# Patient Record
Sex: FEMALE | Race: BLACK OR AFRICAN AMERICAN | NOT HISPANIC OR LATINO | Employment: PART TIME | URBAN - METROPOLITAN AREA
[De-identification: names, ages, dates, MRNs, and addresses within clinical notes are randomized per-mention and may not be internally consistent; named-entity substitution may affect disease eponyms.]

---

## 2020-07-26 ENCOUNTER — HOSPITAL ENCOUNTER (EMERGENCY)
Facility: HOSPITAL | Age: 20
Discharge: HOME | End: 2020-07-27
Attending: EMERGENCY MEDICINE
Payer: COMMERCIAL

## 2020-07-26 DIAGNOSIS — N39.0 URINARY TRACT INFECTION WITHOUT HEMATURIA, SITE UNSPECIFIED: ICD-10-CM

## 2020-07-26 DIAGNOSIS — R10.2 PELVIC PAIN: Primary | ICD-10-CM

## 2020-07-26 LAB
ALBUMIN SERPL BCP-MCNC: 4.2 G/DL (ref 3.5–5.2)
ALP SERPL-CCNC: 43 U/L (ref 55–135)
ALT SERPL W/O P-5'-P-CCNC: 23 U/L (ref 10–44)
AMPHET+METHAMPHET UR QL: NEGATIVE
ANION GAP SERPL CALC-SCNC: 9 MMOL/L (ref 8–16)
AST SERPL-CCNC: 20 U/L (ref 10–40)
B-HCG UR QL: NEGATIVE
BACTERIA #/AREA URNS HPF: ABNORMAL /HPF
BARBITURATES UR QL SCN>200 NG/ML: NEGATIVE
BASOPHILS # BLD AUTO: 0.03 K/UL (ref 0–0.2)
BASOPHILS NFR BLD: 0.4 % (ref 0–1.9)
BENZODIAZ UR QL SCN>200 NG/ML: NEGATIVE
BILIRUB SERPL-MCNC: 0.8 MG/DL (ref 0.1–1)
BILIRUB UR QL STRIP: NEGATIVE
BUN SERPL-MCNC: 8 MG/DL (ref 6–20)
BZE UR QL SCN: NEGATIVE
CALCIUM SERPL-MCNC: 9 MG/DL (ref 8.7–10.5)
CANNABINOIDS UR QL SCN: NEGATIVE
CHLORIDE SERPL-SCNC: 106 MMOL/L (ref 95–110)
CK SERPL-CCNC: 53 U/L (ref 20–180)
CLARITY UR: CLEAR
CO2 SERPL-SCNC: 25 MMOL/L (ref 23–29)
COLOR UR: YELLOW
CREAT SERPL-MCNC: 0.6 MG/DL (ref 0.5–1.4)
CREAT UR-MCNC: 45 MG/DL (ref 15–325)
CTP QC/QA: YES
DIFFERENTIAL METHOD: ABNORMAL
EOSINOPHIL # BLD AUTO: 0.1 K/UL (ref 0–0.5)
EOSINOPHIL NFR BLD: 0.7 % (ref 0–8)
ERYTHROCYTE [DISTWIDTH] IN BLOOD BY AUTOMATED COUNT: 13.5 % (ref 11.5–14.5)
EST. GFR  (AFRICAN AMERICAN): >60 ML/MIN/1.73 M^2
EST. GFR  (NON AFRICAN AMERICAN): >60 ML/MIN/1.73 M^2
GLUCOSE SERPL-MCNC: 90 MG/DL (ref 70–110)
GLUCOSE UR QL STRIP: NEGATIVE
HCT VFR BLD AUTO: 35.1 % (ref 37–48.5)
HGB BLD-MCNC: 11.5 G/DL (ref 12–16)
HGB UR QL STRIP: NEGATIVE
HYALINE CASTS #/AREA URNS LPF: 11 /LPF
IMM GRANULOCYTES # BLD AUTO: 0.02 K/UL (ref 0–0.04)
IMM GRANULOCYTES NFR BLD AUTO: 0.2 % (ref 0–0.5)
KETONES UR QL STRIP: NEGATIVE
LEUKOCYTE ESTERASE UR QL STRIP: ABNORMAL
LIPASE SERPL-CCNC: 35 U/L (ref 4–60)
LYMPHOCYTES # BLD AUTO: 2.3 K/UL (ref 1–4.8)
LYMPHOCYTES NFR BLD: 28.2 % (ref 18–48)
MCH RBC QN AUTO: 25.2 PG (ref 27–31)
MCHC RBC AUTO-ENTMCNC: 32.8 G/DL (ref 32–36)
MCV RBC AUTO: 77 FL (ref 82–98)
MICROSCOPIC COMMENT: ABNORMAL
MONOCYTES # BLD AUTO: 0.6 K/UL (ref 0.3–1)
MONOCYTES NFR BLD: 7.3 % (ref 4–15)
NEUTROPHILS # BLD AUTO: 5.2 K/UL (ref 1.8–7.7)
NEUTROPHILS NFR BLD: 63.2 % (ref 38–73)
NITRITE UR QL STRIP: NEGATIVE
NRBC BLD-RTO: 0 /100 WBC
OPIATES UR QL SCN: NEGATIVE
PCP UR QL SCN>25 NG/ML: NEGATIVE
PH UR STRIP: 6 [PH] (ref 5–8)
PLATELET # BLD AUTO: 280 K/UL (ref 150–350)
PMV BLD AUTO: 10.8 FL (ref 9.2–12.9)
POTASSIUM SERPL-SCNC: 3.4 MMOL/L (ref 3.5–5.1)
PROT SERPL-MCNC: 7.7 G/DL (ref 6–8.4)
PROT UR QL STRIP: NEGATIVE
RBC # BLD AUTO: 4.56 M/UL (ref 4–5.4)
RBC #/AREA URNS HPF: 1 /HPF (ref 0–4)
SODIUM SERPL-SCNC: 140 MMOL/L (ref 136–145)
SP GR UR STRIP: 1.01 (ref 1–1.03)
SQUAMOUS #/AREA URNS HPF: 7 /HPF
TOXICOLOGY INFORMATION: NORMAL
URN SPEC COLLECT METH UR: ABNORMAL
UROBILINOGEN UR STRIP-ACNC: NEGATIVE EU/DL
WBC # BLD AUTO: 8.19 K/UL (ref 3.9–12.7)
WBC #/AREA URNS HPF: 16 /HPF (ref 0–5)

## 2020-07-26 PROCEDURE — 81025 URINE PREGNANCY TEST: CPT | Performed by: EMERGENCY MEDICINE

## 2020-07-26 PROCEDURE — 80053 COMPREHEN METABOLIC PANEL: CPT

## 2020-07-26 PROCEDURE — 85025 COMPLETE CBC W/AUTO DIFF WBC: CPT

## 2020-07-26 PROCEDURE — 87086 URINE CULTURE/COLONY COUNT: CPT

## 2020-07-26 PROCEDURE — 99284 EMERGENCY DEPT VISIT MOD MDM: CPT | Mod: 25

## 2020-07-26 PROCEDURE — 96365 THER/PROPH/DIAG IV INF INIT: CPT

## 2020-07-26 PROCEDURE — 81001 URINALYSIS AUTO W/SCOPE: CPT | Mod: 59

## 2020-07-26 PROCEDURE — 83690 ASSAY OF LIPASE: CPT

## 2020-07-26 PROCEDURE — 82550 ASSAY OF CK (CPK): CPT

## 2020-07-26 PROCEDURE — 80307 DRUG TEST PRSMV CHEM ANLYZR: CPT

## 2020-07-27 VITALS
RESPIRATION RATE: 18 BRPM | WEIGHT: 102 LBS | HEART RATE: 78 BPM | BODY MASS INDEX: 17.42 KG/M2 | DIASTOLIC BLOOD PRESSURE: 53 MMHG | HEIGHT: 64 IN | OXYGEN SATURATION: 100 % | TEMPERATURE: 98 F | SYSTOLIC BLOOD PRESSURE: 103 MMHG

## 2020-07-27 PROCEDURE — 25500020 PHARM REV CODE 255: Performed by: EMERGENCY MEDICINE

## 2020-07-27 PROCEDURE — 25000003 PHARM REV CODE 250: Performed by: EMERGENCY MEDICINE

## 2020-07-27 PROCEDURE — 63600175 PHARM REV CODE 636 W HCPCS: Performed by: EMERGENCY MEDICINE

## 2020-07-27 RX ORDER — POTASSIUM CHLORIDE 20 MEQ/1
40 TABLET, EXTENDED RELEASE ORAL ONCE
Status: COMPLETED | OUTPATIENT
Start: 2020-07-27 | End: 2020-07-27

## 2020-07-27 RX ORDER — AMOXICILLIN AND CLAVULANATE POTASSIUM 875; 125 MG/1; MG/1
1 TABLET, FILM COATED ORAL 2 TIMES DAILY
Qty: 20 TABLET | Refills: 0 | Status: SHIPPED | OUTPATIENT
Start: 2020-07-27

## 2020-07-27 RX ADMIN — POTASSIUM CHLORIDE 40 MEQ: 1500 TABLET, EXTENDED RELEASE ORAL at 01:07

## 2020-07-27 RX ADMIN — CEFTRIAXONE SODIUM 1 G: 1 INJECTION, POWDER, FOR SOLUTION INTRAMUSCULAR; INTRAVENOUS at 01:07

## 2020-07-27 RX ADMIN — IOHEXOL 75 ML: 350 INJECTION, SOLUTION INTRAVENOUS at 12:07

## 2020-07-27 NOTE — ED PROVIDER NOTES
Encounter Date: 7/26/2020       History     Chief Complaint   Patient presents with    Abdominal Pain    Back Pain     This is a 20-year-old female who presents complaining of intermittent crampy suprapubic pain with associated nonradiating lower back pain.  The symptoms have been intermittent over the past several days although she states she has had symptoms of this nature off and on for up to a year.  She has not been seen by gynecologist or primary care physician.  The symptoms did worsen slightly over the last 3 days and were occurring more frequently.  She states she is not sexually active and has never had intercourse.  She denies any vaginal discharge or irregularity of her menses.  She denies fever or chills.  She denies any coughing cold or COVID-19 type symptoms.  She denies nausea vomiting or diarrhea.  Symptoms have been mild to moderate in intensity with no exacerbating or alleviating factors.  Symptoms are not worse with movement.  She denies any other problems or complaints.        Review of patient's allergies indicates:  No Known Allergies  No past medical history on file.  No past surgical history on file.  No family history on file.  Social History     Tobacco Use    Smoking status: Not on file   Substance Use Topics    Alcohol use: Not on file    Drug use: Not on file     Review of Systems   Constitutional: Negative.  Negative for activity change, appetite change, chills, fatigue and fever.   HENT: Negative.  Negative for congestion, dental problem, ear pain, rhinorrhea, sinus pressure, sinus pain, sore throat and trouble swallowing.    Eyes: Negative.  Negative for photophobia, pain, redness and visual disturbance.   Respiratory: Negative.  Negative for cough, chest tightness, shortness of breath and wheezing.    Cardiovascular: Negative.  Negative for chest pain, palpitations and leg swelling.   Gastrointestinal: Positive for abdominal pain. Negative for abdominal distention, anal  bleeding, blood in stool, constipation, diarrhea, nausea and vomiting.   Endocrine: Negative.    Genitourinary: Negative.  Negative for decreased urine volume, difficulty urinating, dysuria, flank pain, frequency, hematuria, pelvic pain and urgency.   Musculoskeletal: Negative.  Negative for arthralgias, back pain, gait problem, joint swelling, myalgias, neck pain and neck stiffness.   Skin: Negative.  Negative for color change, pallor and rash.   Neurological: Negative.  Negative for dizziness, tremors, seizures, syncope, facial asymmetry, speech difficulty, weakness, light-headedness, numbness and headaches.   Hematological: Negative.  Does not bruise/bleed easily.   Psychiatric/Behavioral: Negative.  Negative for confusion.   All other systems reviewed and are negative.      Physical Exam     Initial Vitals [07/26/20 2246]   BP Pulse Resp Temp SpO2   115/70 84 20 98.4 °F (36.9 °C) 99 %      MAP       --         Physical Exam    Nursing note and vitals reviewed.  Constitutional: She is active and cooperative.  Non-toxic appearance. She does not have a sickly appearance. She does not appear ill. No distress.   HENT:   Head: Normocephalic and atraumatic.   Right Ear: Tympanic membrane normal.   Left Ear: Tympanic membrane normal.   Nose: Nose normal.   Mouth/Throat: Uvula is midline, oropharynx is clear and moist and mucous membranes are normal. No oral lesions. No uvula swelling. No oropharyngeal exudate, posterior oropharyngeal edema or posterior oropharyngeal erythema.   Eyes: Conjunctivae, EOM and lids are normal. Pupils are equal, round, and reactive to light. No scleral icterus.   Neck: Trachea normal, normal range of motion, full passive range of motion without pain and phonation normal. Neck supple. No thyroid mass present. No stridor present. No spinous process tenderness and no muscular tenderness present. No edema, no erythema and normal range of motion present. No neck rigidity. No JVD present.    Cardiovascular: Normal rate, regular rhythm, normal heart sounds, intact distal pulses and normal pulses. Exam reveals no gallop and no friction rub.    No murmur heard.  Pulmonary/Chest: Effort normal and breath sounds normal. No accessory muscle usage. No tachypnea. No respiratory distress. She has no wheezes. She has no rhonchi. She has no rales.   Abdominal: Soft. Normal appearance and bowel sounds are normal. She exhibits no distension, no pulsatile midline mass and no mass. There is abdominal tenderness in the suprapubic area. There is rebound. There is no rigidity, no guarding, no CVA tenderness, no tenderness at McBurney's point and negative Amaya's sign.   Genitourinary:    Genitourinary Comments: Patient refused pelvic exam.     Musculoskeletal: Normal range of motion. No tenderness or edema.      Comments: Pulses are 2+ throughout, cap refill is less than 2 sec throughout, extremities are nontender throughout with full range of motion. There is no spinal tenderness to palpation.   Neurological: She is alert and oriented to person, place, and time. She has normal strength. She displays normal reflexes. No cranial nerve deficit or sensory deficit.   No focal deficits.   Skin: Skin is warm, dry and intact. Capillary refill takes less than 2 seconds. No ecchymosis, no petechiae and no rash noted. No erythema. No pallor.   Psychiatric: She has a normal mood and affect. Her speech is normal and behavior is normal. Judgment and thought content normal. Cognition and memory are normal.         ED Course   Procedures  Labs Reviewed   CBC W/ AUTO DIFFERENTIAL - Abnormal; Notable for the following components:       Result Value    Hemoglobin 11.5 (*)     Hematocrit 35.1 (*)     Mean Corpuscular Volume 77 (*)     Mean Corpuscular Hemoglobin 25.2 (*)     All other components within normal limits   COMPREHENSIVE METABOLIC PANEL - Abnormal; Notable for the following components:    Potassium 3.4 (*)     Alkaline  Phosphatase 43 (*)     All other components within normal limits   URINALYSIS, REFLEX TO URINE CULTURE - Abnormal; Notable for the following components:    Leukocytes, UA 2+ (*)     All other components within normal limits    Narrative:     Specimen Source->Urine   URINALYSIS MICROSCOPIC - Abnormal; Notable for the following components:    WBC, UA 16 (*)     Bacteria Many (*)     Hyaline Casts, UA 11 (*)     All other components within normal limits    Narrative:     Specimen Source->Urine   CULTURE, URINE   LIPASE   DRUG SCREEN PANEL, URINE EMERGENCY    Narrative:     Specimen Source->Urine   CK   CK   POCT URINE PREGNANCY          Imaging Results          CT Abdomen Pelvis With Contrast (Final result)  Result time 07/26/20 23:21:00    Final result by Catalino Sanchez MD (07/26/20 23:21:00)                 Narrative:    EXAM DESCRIPTION: CT ABDOMEN PELVIS WITH CONTRAST 7/27/2020 12:37 AM CDT    CLINICAL HISTORY: 20 years, Female, Abdominal infection suspected    COMPARISON: None.    PROCEDURE:  Contrast-enhanced images of the abdomen and pelvis were performed utilizing 2 mm slice thickness at 2 mm interval reconstruction from the lung bases to the ischial tuberosities after the administration of 75 cc of Omnipaque 350 at the rate of 3.0 cc/sec.  In addition multiplanar reformats in the coronal and sagittal plane were obtained and reviewed.  An individualized dose optimization technique, Automated Exposure Control, was utilized for the performed procedure.    FINDINGS:  The lung bases demonstrate to be clear.    The liver, gallbladder, pancreas, spleen and adrenal glands demonstrate to be unremarkable, no focal lesions are noted.  The kidneys demonstrate normal uptake of contrast media.  No hydronephrosis and/or stones were identified.  Grossly the unopacified stomach, small bowel and large bowel demonstrate to be within normal limits. Fecal residue in underdistention within the large bowel limits the evaluation.  Mild fecal stasis.  There is no evidence for free air and/or significant bowel dilatation.  The appendix is normal.  The urinary bladder demonstrate to be unremarkable.  The uterus demonstrate to be unremarkable. There are no adnexal masses.  The aorta demonstrate to be normal.  There is no retroperitoneal lymphadenopathy. There is no evidence for ascites. The rest of the soft tissue and bony structures are within normal limits.    IMPRESSION:    UNREMARKABLE CT SCAN OF THE ABDOMEN AND PELVIS WITH CONTRAST. NORMAL APPENDIX. NO EVIDENCE FOR ACUTE INTRA-ABDOMINAL PROCESS.    Electronically signed by:  Catalino Sanchez MD  7/27/2020 12:40 AM CDT Workstation: [id number]                               Medical Decision Making:   Clinical Tests:   Lab Tests: Reviewed  Radiological Study: Reviewed  Medical Tests: Reviewed  ED Management:  The patient has evidence of urinary tract infection.  CT scan is negative for evidence of acute intra-abdominal abnormality.  Any incidental findings have been discussed and need for follow-up has been discussed.  Denies any current abdominal pain.  Abdominal reassessed is soft and nontender.  Patient has receive Rocephin.  A urine culture has been obtained.  She has no evidence of shock or hypoperfusion.  She is well-appearing.  Patient will be treated with Augmentin.  Have explained the importance of close outpatient evaluation with her primary care physician and have recommended the need for gyn evaluation for pelvic exam and Pap smear.  Patient states she is not sexually active and she has refused a pelvic exam in the emergency room.  I have explained the possibility of missing any evidence of pelvic infection without doing this exam which could result in complications including severe infection and infertility.  Return precautions have been discussed in detail importance of follow-up has been discussed.                                 Clinical Impression:       ICD-10-CM ICD-9-CM   1.  Pelvic pain  R10.2 [id number]   2. Urinary tract infection without hematuria, site unspecified  N39.0 599.0                                Franny Seo MD  07/27/20 0217

## 2020-07-27 NOTE — DISCHARGE INSTRUCTIONS
Please read and follow discharge instructions and return precautions.  Augmentin as directed until completed.  Please follow-up with your primary care physician or a primary care physician through the [facility] network.  Please follow-up with Dr. Jj a lot or a gynecologist of your choice.  Return immediately if you develop new or worsening symptoms  or if you have any problems or concerns.  Tylenol or ibuprofen over-the-counter as directed.  Rest, avoid any strenuous activity or overheating.  Keep well hydrated, drink 8 glasses of water a day.

## 2020-07-28 LAB
BACTERIA UR CULT: NORMAL
BACTERIA UR CULT: NORMAL